# Patient Record
Sex: FEMALE | Race: WHITE | NOT HISPANIC OR LATINO
[De-identification: names, ages, dates, MRNs, and addresses within clinical notes are randomized per-mention and may not be internally consistent; named-entity substitution may affect disease eponyms.]

---

## 2019-08-08 ENCOUNTER — FORM ENCOUNTER (OUTPATIENT)
Age: 54
End: 2019-08-08

## 2020-01-30 ENCOUNTER — FORM ENCOUNTER (OUTPATIENT)
Age: 55
End: 2020-01-30

## 2020-06-05 ENCOUNTER — FORM ENCOUNTER (OUTPATIENT)
Age: 55
End: 2020-06-05

## 2020-08-13 ENCOUNTER — FORM ENCOUNTER (OUTPATIENT)
Age: 55
End: 2020-08-13

## 2021-02-26 ENCOUNTER — APPOINTMENT (OUTPATIENT)
Dept: BREAST CENTER | Facility: CLINIC | Age: 56
End: 2021-02-26

## 2021-03-01 PROBLEM — Z00.00 ENCOUNTER FOR PREVENTIVE HEALTH EXAMINATION: Status: ACTIVE | Noted: 2021-03-01

## 2021-03-15 DIAGNOSIS — Z78.9 OTHER SPECIFIED HEALTH STATUS: ICD-10-CM

## 2021-03-15 DIAGNOSIS — Z86.39 PERSONAL HISTORY OF OTHER ENDOCRINE, NUTRITIONAL AND METABOLIC DISEASE: ICD-10-CM

## 2021-03-15 DIAGNOSIS — Z85.3 PERSONAL HISTORY OF MALIGNANT NEOPLASM OF BREAST: ICD-10-CM

## 2021-03-18 ENCOUNTER — APPOINTMENT (OUTPATIENT)
Dept: BREAST CENTER | Facility: CLINIC | Age: 56
End: 2021-03-18
Payer: COMMERCIAL

## 2021-03-18 VITALS
BODY MASS INDEX: 32.78 KG/M2 | HEIGHT: 63 IN | DIASTOLIC BLOOD PRESSURE: 70 MMHG | SYSTOLIC BLOOD PRESSURE: 110 MMHG | WEIGHT: 185 LBS

## 2021-03-18 DIAGNOSIS — Z80.3 FAMILY HISTORY OF MALIGNANT NEOPLASM OF BREAST: ICD-10-CM

## 2021-03-18 DIAGNOSIS — Z86.39 PERSONAL HISTORY OF OTHER ENDOCRINE, NUTRITIONAL AND METABOLIC DISEASE: ICD-10-CM

## 2021-03-18 PROCEDURE — 99072 ADDL SUPL MATRL&STAF TM PHE: CPT

## 2021-03-18 PROCEDURE — 99213 OFFICE O/P EST LOW 20 MIN: CPT

## 2021-03-18 RX ORDER — ATORVASTATIN CALCIUM 20 MG/1
20 TABLET, FILM COATED ORAL
Refills: 0 | Status: ACTIVE | COMMUNITY

## 2021-03-18 RX ORDER — METFORMIN HYDROCHLORIDE 500 MG/1
500 TABLET, COATED ORAL
Refills: 0 | Status: ACTIVE | COMMUNITY

## 2021-03-18 RX ORDER — ERGOCALCIFEROL 1.25 MG/1
1.25 MG CAPSULE, LIQUID FILLED ORAL
Refills: 0 | Status: ACTIVE | COMMUNITY

## 2021-03-18 NOTE — PHYSICAL EXAM
[Normocephalic] : normocephalic [EOMI] : extra ocular movement intact [Supple] : supple [No Supraclavicular Adenopathy] : no supraclavicular adenopathy [No Cervical Adenopathy] : no cervical adenopathy [Examined in the supine and seated position] : examined in the supine and seated position [No dominant masses] : no dominant masses in right breast  [No dominant masses] : no dominant masses left breast [Breast Mass Right Breast ___cm] : no masses [Breast Nipple Inversion Left] : nipple not inverted [Breast Nipple Retraction Left] : nipple not retracted [Breast Nipple Flattening Left] : nipple not flattened [Breast Nipple Fissures Left] : nipple not fissured [Breast Abnormal Lactation (Galactorrhea) Left] : no galactorrhea [Breast Abnormal Secretion Bloody Fluid Left] : no bloody discharge [Breast Abnormal Secretion Serous Fluid Left] : no serous discharge [Breast Abnormal Secretion Opalescent Fluid Left] : no milky discharge [Breast Mass Left Breast ___cm] : no masses [No Axillary Lymphadenopathy] : no left axillary lymphadenopathy [No Edema] : no edema [No Rashes] : no rashes [No Ulceration] : no ulceration [de-identified] : On exam, the patient has the obvious mastectomy in the right breast with TRAM reconstruction.  I cannot feel any evidence of recurrence in the right TRAM reconstruction and no suspicious findings in the left breast.  She has no axillary, supraclavicular, or cervical adenopathy. [de-identified] : Status is post total mastectomy and TRAM reconstruction [de-identified] : Status post reduction mastopexy for symmetry

## 2021-03-18 NOTE — HISTORY OF PRESENT ILLNESS
[FreeTextEntry1] : The patient is a 56-year-old  postmenopausal white female.  She underwent menarche at age 12 and had her first child at age 28.  She underwent menopause at age 37 and never took any hormone replacement therapy.  She has no family history of breast or ovarian cancer.  The patient was diagnosed with a right breast cancer when she was 37 years old in  and underwent a right breast total mastectomy and TRAM reconstruction performed by Dr. Mallory Houston and Dr. Cash Damon at [facility].  She had a left breast reduction mastopexy for symmetry in .  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  She was advised on yearly MRIs in addition to yearly mammography and ultrasound and comes in for routine 6-month follow-up.

## 2021-03-18 NOTE — REASON FOR VISIT
[Follow-Up: _____] : a [unfilled] follow-up visit [FreeTextEntry1] : The patient comes in for routine follow-up with a history of undergoing a right breast mastectomy TRAM reconstruction by Dr. Kathryn Houston when she was still [facility] in 2002.  The reconstruction was performed by Dr. Cash Damon.  She had a reduction mastopexy in 2003 of the left breast.  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

## 2021-03-18 NOTE — ASSESSMENT
[FreeTextEntry1] : The patient is a 56-year-old  postmenopausal white female.  She underwent menarche at age 12 and had her first child at age 28.  She underwent menopause at age 37 and never took any hormone replacement therapy.  She has no family history of breast or ovarian cancer.  The patient was diagnosed with a right breast cancer when she was 37 years old in  and underwent a right breast total mastectomy and TRAM reconstruction performed by Dr. Mallory Houston and Dr. Cash Damon at [facility].  She had a left breast reduction mastopexy for symmetry in .  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  On exam, she has no evidence of recurrence over the right TRAM reconstruction in the left breast shows no suspicious findings.  She underwent a left breast mammography and ultrasound performed today here at [facility] on 2021 which is unchanged showing no suspicious findings.  Her last MRI was back in 2020 which was unchanged and negative.  She will be due for another bilateral breast MRI in 2021 and I would like to see her again in 6 months around 2021.

## 2022-02-14 DIAGNOSIS — Z12.31 ENCOUNTER FOR SCREENING MAMMOGRAM FOR MALIGNANT NEOPLASM OF BREAST: ICD-10-CM

## 2022-04-19 NOTE — PHYSICAL EXAM
[Normocephalic] : normocephalic [EOMI] : extra ocular movement intact [Supple] : supple [No Supraclavicular Adenopathy] : no supraclavicular adenopathy [No Cervical Adenopathy] : no cervical adenopathy [Examined in the supine and seated position] : examined in the supine and seated position [No dominant masses] : no dominant masses in right breast  [No dominant masses] : no dominant masses left breast [Breast Mass Right Breast ___cm] : no masses [Breast Nipple Inversion Left] : nipple not inverted [Breast Nipple Retraction Left] : nipple not retracted [Breast Nipple Flattening Left] : nipple not flattened [Breast Nipple Fissures Left] : nipple not fissured [Breast Abnormal Lactation (Galactorrhea) Left] : no galactorrhea [Breast Abnormal Secretion Bloody Fluid Left] : no bloody discharge [Breast Abnormal Secretion Serous Fluid Left] : no serous discharge [Breast Abnormal Secretion Opalescent Fluid Left] : no milky discharge [Breast Mass Left Breast ___cm] : no masses [No Axillary Lymphadenopathy] : no left axillary lymphadenopathy [No Edema] : no edema [No Rashes] : no rashes [No Ulceration] : no ulceration [de-identified] : On exam, the patient has the obvious mastectomy in the right breast with TRAM reconstruction.  I cannot feel any evidence of recurrence in the right TRAM reconstruction and no suspicious findings in the left breast.  She has no axillary, supraclavicular, or cervical adenopathy. [de-identified] : Status is post total mastectomy and TRAM reconstruction [de-identified] : Status post reduction mastopexy for symmetry

## 2022-04-19 NOTE — HISTORY OF PRESENT ILLNESS
[FreeTextEntry1] : The patient is a 56-year-old  postmenopausal white female.  She underwent menarche at age 12 and had her first child at age 28.  She underwent menopause at age 37 and never took any hormone replacement therapy.  She has no family history of breast or ovarian cancer.  The patient was diagnosed with a right breast cancer when she was 37 years old in  and underwent a right breast total mastectomy and TRAM reconstruction performed by Dr. Mallory Houston and Dr. Cash Damon at [facility].  She had a left breast reduction mastopexy for symmetry in .  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  She was advised on yearly MRIs in addition to yearly mammography and ultrasound and comes in for routine 6-month follow-up.

## 2022-04-19 NOTE — REASON FOR VISIT
[Follow-Up: _____] : a [unfilled] follow-up visit [FreeTextEntry1] : The patient comes in for routine follow-up with a history of undergoing a right breast mastectomy TRAM reconstruction by Dr. Kathryn Houston when she was still [facility] in 2002.  The reconstruction was performed by Dr. Cash Damon.  She had a reduction mastopexy in 2003 of the left breast.  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

## 2022-04-19 NOTE — ASSESSMENT
[FreeTextEntry1] : The patient is a 57-year-old  postmenopausal white female.  She underwent menarche at age 12 and had her first child at age 28.  She underwent menopause at age 37 and never took any hormone replacement therapy.  She has no family history of breast or ovarian cancer.  The patient was diagnosed with a right breast cancer when she was 37 years old in  and underwent a right breast total mastectomy and TRAM reconstruction performed by Dr. Mallory Houston and Dr. Cash Damon at [facility].  She had a left breast reduction mastopexy for symmetry in .  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  On exam, she has no evidence of recurrence over the right TRAM reconstruction in the left breast shows no suspicious findings.  She underwent her last left breast mammography and ultrasound performed which was reviewed from ??????? at [facility] on 2021 which is unchanged showing no suspicious findings.  Her last MRI was back in ???????? 2020 which was unchanged and negative.  The patient was reassured and should follow-up again in 6 months in 2022 due to this CHEK2 mutation.  Her next bilateral mammography and ultrasound will be due on ?????? and her next bilateral breast MRI will be due in ?????? and she was given prescriptions.

## 2022-04-22 ENCOUNTER — RESULT REVIEW (OUTPATIENT)
Age: 57
End: 2022-04-22

## 2022-04-22 ENCOUNTER — APPOINTMENT (OUTPATIENT)
Dept: BREAST CENTER | Facility: CLINIC | Age: 57
End: 2022-04-22
Payer: COMMERCIAL

## 2022-04-22 VITALS
WEIGHT: 170 LBS | OXYGEN SATURATION: 99 % | HEART RATE: 75 BPM | BODY MASS INDEX: 30.12 KG/M2 | SYSTOLIC BLOOD PRESSURE: 106 MMHG | HEIGHT: 63 IN | DIASTOLIC BLOOD PRESSURE: 68 MMHG

## 2022-04-22 PROCEDURE — 99213 OFFICE O/P EST LOW 20 MIN: CPT

## 2022-04-22 NOTE — PHYSICAL EXAM
[Normocephalic] : normocephalic [EOMI] : extra ocular movement intact [Supple] : supple [No Supraclavicular Adenopathy] : no supraclavicular adenopathy [No Cervical Adenopathy] : no cervical adenopathy [Examined in the supine and seated position] : examined in the supine and seated position [No dominant masses] : no dominant masses in right breast  [No dominant masses] : no dominant masses left breast [Breast Mass Right Breast ___cm] : no masses [Breast Mass Left Breast ___cm] : no masses [No Axillary Lymphadenopathy] : no left axillary lymphadenopathy [No Edema] : no edema [No Rashes] : no rashes [No Ulceration] : no ulceration [Breast Nipple Inversion Left] : nipple not inverted [Breast Nipple Retraction Left] : nipple not retracted [Breast Nipple Flattening Left] : nipple not flattened [Breast Nipple Fissures Left] : nipple not fissured [Breast Abnormal Secretion Bloody Fluid Left] : no bloody discharge [Breast Abnormal Lactation (Galactorrhea) Left] : no galactorrhea [Breast Abnormal Secretion Serous Fluid Left] : no serous discharge [Breast Abnormal Secretion Opalescent Fluid Left] : no milky discharge [de-identified] : On exam, the patient has the obvious mastectomy in the right breast with TRAM reconstruction.  I cannot feel any evidence of recurrence in the right TRAM reconstruction and no suspicious findings in the left breast.  She has no axillary, supraclavicular, or cervical adenopathy. [de-identified] : Status is post total mastectomy and TRAM reconstruction [de-identified] : Status post reduction mastopexy for symmetry

## 2022-04-22 NOTE — HISTORY OF PRESENT ILLNESS
[FreeTextEntry1] : The patient is a 56-year-old  postmenopausal white female.  She underwent menarche at age 12 and had her first child at age 28.  She underwent menopause at age 37 and never took any hormone replacement therapy.  She has no family history of breast or ovarian cancer.  The patient was diagnosed with a right breast cancer when she was 37 years old in  and underwent a right breast total mastectomy and TRAM reconstruction performed by Dr. Mallory Houston and Dr. Cash Damon at [facility].  She had a left breast reduction mastopexy for symmetry in .  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  She was advised on yearly MRIs in addition to yearly mammography and ultrasound and comes in for routine 6-month follow-up.

## 2022-04-22 NOTE — REASON FOR VISIT
[Follow-Up: _____] : a [unfilled] follow-up visit [FreeTextEntry1] : The patient comes in for routine follow-up with a history of undergoing a right breast mastectomy TRAM reconstruction by Dr. Kathryn Houston when she was still [facility] in 2002.  The reconstruction was performed by Dr. Cash Damon.  She had a reduction mastopexy in 2003 of the left breast.  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

## 2022-04-22 NOTE — ASSESSMENT
[FreeTextEntry1] : The patient is a 57-year-old  postmenopausal white female.  She underwent menarche at age 12 and had her first child at age 28.  She underwent menopause at age 37 and never took any hormone replacement therapy.  She has no family history of breast or ovarian cancer.  The patient was diagnosed with a right breast cancer when she was 37 years old in  and underwent a right breast total mastectomy and TRAM reconstruction performed by Dr. Mallory Houston and Dr. Cash Damon at [facility].  She had a left breast reduction mastopexy for symmetry in .  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  On exam, she has no evidence of recurrence over the right TRAM reconstruction in the left breast shows no suspicious findings.  She underwent her last left breast mammography and ultrasound performed which was reviewed from [facility] and performed on 2022 which is showing no suspicious findings but some chronic cystic findings in the left breast 1 2 and 3:00 regions and follow-up diagnostic left breast mammography and ultrasound was recommended in 6 months.  Her last MRI was back in 2020 which was unchanged and negative.  The patient was reassured and should follow-up again in 1 year in 2023 due to this CHEK2 mutation.  Her next diagnostic left breast mammography and ultrasound will be due in 2022 and would like her to get another bilateral breast MRI in 2022 due to this CHEK2 mutation and she was given prescriptions.  Unfortunately, the patient is moving down to [place], [place] and I have asked her to get all her studies done down there and she will only be able to come up to see me yearly so I will see her again in 2023.  She will need to get a CD-ROM of all her prior imaging from radiology before she goes down to [place] and she is aware of this.  She will try and find another breast physician down in [place] so she can get more routine 6-month clinical follow-up at that time.

## 2023-05-09 NOTE — HISTORY OF PRESENT ILLNESS
[FreeTextEntry1] : The patient is a 58-year-old  postmenopausal white female.  She underwent menarche at age 12 and had her first child at age 28.  She underwent menopause at age 37 and never took any hormone replacement therapy.  She has no family history of breast or ovarian cancer.  The patient was diagnosed with a right breast cancer when she was 37 years old in  and underwent a right breast total mastectomy and TRAM reconstruction performed by Dr. Mallory Houston and Dr. Cash Damon at [facility].  She had a left breast reduction mastopexy for symmetry in .  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  She was advised on yearly MRIs in addition to yearly mammography and ultrasound and she now lives in [place], [place] and comes in for yearly follow-up.

## 2023-05-09 NOTE — REASON FOR VISIT
[Follow-Up: _____] : a [unfilled] follow-up visit [FreeTextEntry1] : The patient comes in for routine follow-up with a history of undergoing a right breast mastectomy TRAM reconstruction by Dr. Kathryn Houston when she was still [facility] in 2002.  The reconstruction was performed by Dr. Cash Damon.  She had a reduction mastopexy in 2003 of the left breast.  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

## 2023-05-09 NOTE — ASSESSMENT
[FreeTextEntry1] : The patient is a 58-year-old  postmenopausal white female.  She underwent menarche at age 12 and had her first child at age 28.  She underwent menopause at age 37 and never took any hormone replacement therapy.  She has no family history of breast or ovarian cancer.  The patient was diagnosed with a right breast cancer when she was 37 years old in  and underwent a right breast total mastectomy and TRAM reconstruction performed by Dr. Mallory Houston and Dr. Cash Damon at [facility].  She had a left breast reduction mastopexy for symmetry in .  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  On exam, she has no evidence of recurrence over the right TRAM reconstruction in the left breast shows no suspicious findings.  She underwent a left breast mammography and ultrasound performed at [facility] on 2022 which showed no suspicious findings but some chronic cystic findings in the left breast 1 2 and 3:00 regions and follow-up diagnostic left breast mammography and ultrasound was recommended in 6 months and was performed on ?????? showing ???????. She has moved down to [place], [place] and underwent her last MRI which was performed on ????????.  The patient was reassured and should follow-up again in 1 year in May 2024 due to this CHEK2 mutation.  Her next diagnostic left breast mammography and ultrasound will be due in ??????? 2022 and I would like her to get another bilateral breast MRI in ??????? 2022 due to this CHEK2 mutation and she was given prescriptions.

## 2023-05-09 NOTE — PHYSICAL EXAM
[Normocephalic] : normocephalic [EOMI] : extra ocular movement intact [Supple] : supple [No Supraclavicular Adenopathy] : no supraclavicular adenopathy [No Cervical Adenopathy] : no cervical adenopathy [Examined in the supine and seated position] : examined in the supine and seated position [No dominant masses] : no dominant masses in right breast  [No dominant masses] : no dominant masses left breast [Breast Mass Right Breast ___cm] : no masses [Breast Nipple Inversion Left] : nipple not inverted [Breast Nipple Retraction Left] : nipple not retracted [Breast Nipple Flattening Left] : nipple not flattened [Breast Nipple Fissures Left] : nipple not fissured [Breast Abnormal Lactation (Galactorrhea) Left] : no galactorrhea [Breast Abnormal Secretion Bloody Fluid Left] : no bloody discharge [Breast Abnormal Secretion Serous Fluid Left] : no serous discharge [Breast Abnormal Secretion Opalescent Fluid Left] : no milky discharge [Breast Mass Left Breast ___cm] : no masses [No Axillary Lymphadenopathy] : no left axillary lymphadenopathy [No Edema] : no edema [No Rashes] : no rashes [No Ulceration] : no ulceration [de-identified] : On exam, the patient has the obvious mastectomy in the right breast with TRAM reconstruction.  I cannot feel any evidence of recurrence in the right TRAM reconstruction and no suspicious findings in the left breast.  She has no axillary, supraclavicular, or cervical adenopathy. [de-identified] : Status post reduction mastopexy for symmetry [de-identified] : Status is post total mastectomy and TRAM reconstruction

## 2023-05-15 ENCOUNTER — APPOINTMENT (OUTPATIENT)
Dept: BREAST CENTER | Facility: CLINIC | Age: 58
End: 2023-05-15

## 2023-07-22 ENCOUNTER — NON-APPOINTMENT (OUTPATIENT)
Age: 58
End: 2023-07-22

## 2023-07-22 NOTE — PHYSICAL EXAM
[Normocephalic] : normocephalic [EOMI] : extra ocular movement intact [Supple] : supple [No Supraclavicular Adenopathy] : no supraclavicular adenopathy [No Cervical Adenopathy] : no cervical adenopathy [Examined in the supine and seated position] : examined in the supine and seated position [No dominant masses] : no dominant masses in right breast  [No dominant masses] : no dominant masses left breast [Breast Mass Right Breast ___cm] : no masses [Breast Mass Left Breast ___cm] : no masses [No Axillary Lymphadenopathy] : no left axillary lymphadenopathy [No Edema] : no edema [No Rashes] : no rashes [No Ulceration] : no ulceration [Breast Nipple Inversion Left] : nipple not inverted [Breast Nipple Retraction Left] : nipple not retracted [Breast Nipple Flattening Left] : nipple not flattened [Breast Nipple Fissures Left] : nipple not fissured [Breast Abnormal Lactation (Galactorrhea) Left] : no galactorrhea [Breast Abnormal Secretion Bloody Fluid Left] : no bloody discharge [Breast Abnormal Secretion Serous Fluid Left] : no serous discharge [Breast Abnormal Secretion Opalescent Fluid Left] : no milky discharge [de-identified] : On exam, the patient has the obvious mastectomy in the right breast with TRAM reconstruction.  I cannot feel any evidence of recurrence in the right TRAM reconstruction and no suspicious findings in the left breast.  She has no axillary, supraclavicular, or cervical adenopathy. [de-identified] : Status is post total mastectomy and TRAM reconstruction [de-identified] : Status post reduction mastopexy for symmetry

## 2023-07-22 NOTE — REASON FOR VISIT
[Follow-Up: _____] : a [unfilled] follow-up visit [FreeTextEntry1] : The patient comes in for routine follow-up with a history of undergoing a right breast mastectomy TRAM reconstruction by Dr. Kathryn Houston when she was still [facility] in 2002.  The reconstruction was performed by Dr. Cash Damon.  She had a reduction mastopexy in 2003 of the left breast.  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

## 2023-07-22 NOTE — HISTORY OF PRESENT ILLNESS
[FreeTextEntry1] : The patient is a 58-year-old  postmenopausal white female.  She underwent menarche at age 12 and had her first child at age 28.  She underwent menopause at age 37 and never took any hormone replacement therapy.  She has no family history of breast or ovarian cancer.  The patient was diagnosed with a right breast cancer when she was 37 years old in  and underwent a right breast total mastectomy and TRAM reconstruction performed by Dr. Mallory Houston and Dr. Cash Damon at [facility].  She had a left breast reduction mastopexy for symmetry in .  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  She was advised on yearly MRIs in addition to yearly mammography and ultrasound and she now lives in [place], [place] and comes in for yearly follow-up.

## 2023-07-22 NOTE — ASSESSMENT
[FreeTextEntry1] : The patient is a 58-year-old  postmenopausal white female.  She underwent menarche at age 12 and had her first child at age 28.  She underwent menopause at age 37 and never took any hormone replacement therapy.  She has no family history of breast or ovarian cancer.  The patient was diagnosed with a right breast cancer when she was 37 years old in  and underwent a right breast total mastectomy and TRAM reconstruction performed by Dr. Mallory Houston and Dr. Cash Damon at [facility].  She had a left breast reduction mastopexy for symmetry in .  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  On exam, she has no evidence of recurrence over the right TRAM reconstruction and the left breast shows no suspicious findings.  She underwent her last left breast mammography performed in [place], [place] on 2023 which showed no suspicious findings but a benign-appearing left 5 mm intramammary lymph node. She has moved down to [place], [place] and underwent her last MRI which was performed on ????????.  The patient was reassured and should follow-up again in 1 year in 2024 due to this CHEK2 mutation.  Her next diagnostic left breast mammography and ultrasound will be due in 2024 and I would like her to get another bilateral breast MRI in ??????? 2023 due to this CHEK2 mutation and she was given prescriptions.

## 2023-08-01 ENCOUNTER — APPOINTMENT (OUTPATIENT)
Dept: BREAST CENTER | Facility: CLINIC | Age: 58
End: 2023-08-01
Payer: COMMERCIAL

## 2023-08-01 VITALS — BODY MASS INDEX: 30.12 KG/M2 | HEIGHT: 63 IN | WEIGHT: 170 LBS

## 2023-08-01 DIAGNOSIS — Z15.01 GENETIC SUSCEPTIBILITY TO MALIGNANT NEOPLASM OF BREAST: ICD-10-CM

## 2023-08-01 DIAGNOSIS — Z85.3 PERSONAL HISTORY OF MALIGNANT NEOPLASM OF BREAST: ICD-10-CM

## 2023-08-01 DIAGNOSIS — R92.2 INCONCLUSIVE MAMMOGRAM: ICD-10-CM

## 2023-08-01 DIAGNOSIS — Z90.11 ACQUIRED ABSENCE OF RIGHT BREAST AND NIPPLE: ICD-10-CM

## 2023-08-01 PROCEDURE — 99213 OFFICE O/P EST LOW 20 MIN: CPT

## 2023-08-01 NOTE — REASON FOR VISIT
[Follow-Up: _____] : a [unfilled] follow-up visit [FreeTextEntry1] : The patient comes in for routine follow-up with a history of undergoing a right breast mastectomy TRAM reconstruction by Dr. Kathryn Houston when she was still [facility] in 2002.  The reconstruction was performed by Dr. Cash Damon.  She had a reduction mastopexy in 2003 of the left breast.  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

## 2023-08-01 NOTE — ASSESSMENT
[FreeTextEntry1] : The patient is a 58-year-old  postmenopausal white female.  She underwent menarche at age 12 and had her first child at age 28.  She underwent menopause at age 37 and never took any hormone replacement therapy.  She has no family history of breast or ovarian cancer.  The patient was diagnosed with a right breast cancer when she was 37 years old in  and underwent a right breast total mastectomy and TRAM reconstruction performed by Dr. Mallory Houston and Dr. Cash Damon at [facility].  She had a left breast reduction mastopexy for symmetry in .  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  On exam, she has no evidence of recurrence over the right TRAM reconstruction and the left breast shows no suspicious findings.  She underwent her last left breast mammography performed in [place], [place] on 2023 which showed no suspicious findings but a benign-appearing left 5 mm intramammary lymph node. She has moved down to [place], [place] and underwent her last MRI which was performed in .  The patient was reassured and should follow-up again in 1 year in 2024 due to this CHEK2 mutation.  Her next diagnostic left breast mammography and ultrasound will be due in 2024 and I would like her to get another bilateral breast MRI in 2023 due to this CHEK2 mutation and she was given prescriptions.

## 2023-08-01 NOTE — PHYSICAL EXAM
[Normocephalic] : normocephalic [EOMI] : extra ocular movement intact [Supple] : supple [No Supraclavicular Adenopathy] : no supraclavicular adenopathy [No Cervical Adenopathy] : no cervical adenopathy [Examined in the supine and seated position] : examined in the supine and seated position [No dominant masses] : no dominant masses in right breast  [No dominant masses] : no dominant masses left breast [Breast Mass Right Breast ___cm] : no masses [Breast Mass Left Breast ___cm] : no masses [No Axillary Lymphadenopathy] : no left axillary lymphadenopathy [No Edema] : no edema [No Rashes] : no rashes [No Ulceration] : no ulceration [Breast Nipple Inversion Left] : nipple not inverted [Breast Nipple Retraction Left] : nipple not retracted [Breast Nipple Flattening Left] : nipple not flattened [Breast Nipple Fissures Left] : nipple not fissured [Breast Abnormal Lactation (Galactorrhea) Left] : no galactorrhea [Breast Abnormal Secretion Bloody Fluid Left] : no bloody discharge [Breast Abnormal Secretion Serous Fluid Left] : no serous discharge [Breast Abnormal Secretion Opalescent Fluid Left] : no milky discharge [de-identified] : On exam, the patient has the obvious mastectomy in the right breast with TRAM reconstruction.  I cannot feel any evidence of recurrence in the right TRAM reconstruction and no suspicious findings in the left breast.  She has no axillary, supraclavicular, or cervical adenopathy. [de-identified] : Status is post total mastectomy and TRAM reconstruction [de-identified] : Status post reduction mastopexy for symmetry

## 2023-08-01 NOTE — HISTORY OF PRESENT ILLNESS
[FreeTextEntry1] : The patient is a 58-year-old  postmenopausal white female.  She underwent menarche at age 12 and had her first child at age 28.  She underwent menopause at age 37 and never took any hormone replacement therapy.  She has no family history of breast or ovarian cancer.  The patient was diagnosed with a right breast cancer when she was 37 years old in  and underwent a right breast total mastectomy and TRAM reconstruction performed by Dr. Mallory Houston and Dr. Cash Damon at [facility].  She had a left breast reduction mastopexy for symmetry in .  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  She was advised on yearly MRIs in addition to yearly mammography and ultrasound and she now lives in [place], [place] and comes in for yearly follow-up.

## 2024-03-26 DIAGNOSIS — Z91.89 OTHER SPECIFIED PERSONAL RISK FACTORS, NOT ELSEWHERE CLASSIFIED: ICD-10-CM

## 2024-07-09 ENCOUNTER — NON-APPOINTMENT (OUTPATIENT)
Age: 59
End: 2024-07-09

## 2024-08-02 NOTE — HISTORY OF PRESENT ILLNESS
[FreeTextEntry1] : The patient is a 59-year-old  postmenopausal white female.  She underwent menarche at age 12 and had her first child at age 28.  She underwent menopause at age 37 and never took any hormone replacement therapy.  She has no family history of breast or ovarian cancer.  The patient was diagnosed with a right breast cancer when she was 37 years old in  and underwent a right breast total mastectomy and TRAM reconstruction performed by Dr. Mallory Houston and Dr. Cash Damon at [facility].  She had a left breast reduction mastopexy for symmetry in .  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  She was advised on yearly MRIs in addition to yearly mammography and ultrasound and she now lives in [place], [place] and comes in for yearly follow-up.

## 2024-08-02 NOTE — REASON FOR VISIT
[Follow-Up: _____] : a [unfilled] follow-up visit [FreeTextEntry1] : The patient comes in for routine follow-up with a history of undergoing a right breast mastectomy TRAM reconstruction by Dr. Kathryn Houston when she was still [facility] in 2002.  The reconstruction was performed by Dr. Cash Damon.  She had a reduction mastopexy in 2003 of the left breast.  She tested CHEK2 positive in 2017 and comes in now for routine exams and gets yearly mammography, ultrasound, and MRIs.

## 2024-08-02 NOTE — ASSESSMENT
[FreeTextEntry1] : The patient is a 59-year-old  postmenopausal white female.  She underwent menarche at age 12 and had her first child at age 28.  She underwent menopause at age 37 and never took any hormone replacement therapy.  She has no family history of breast or ovarian cancer.  The patient was diagnosed with a right breast cancer when she was 37 years old in  and underwent a right breast total mastectomy and TRAM reconstruction performed by Dr. Mallory Houston and Dr. Cash Damon at [facility].  She had a left breast reduction mastopexy for symmetry in .  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  On exam, she has no evidence of recurrence over the right TRAM reconstruction and the left breast shows no suspicious findings.  She underwent her last left breast mammography performed in [place], [place] on ?????? 2023 which showed no suspicious findings but a benign-appearing left 5 mm intramammary lymph node. She has moved down to [place], [place] and underwent her last MRI which was performed in ?????? .  The patient was reassured and should follow-up again in 1 year in 2025 due to this CHEK2 mutation.  Her next diagnostic left breast mammography and ultrasound will be due in ?????? 2025 and I would like her to get another bilateral breast MRI in ??????? 2024 due to this CHEK2 mutation and she was given prescriptions.

## 2024-08-02 NOTE — PHYSICAL EXAM
[Normocephalic] : normocephalic [EOMI] : extra ocular movement intact [Supple] : supple [No Supraclavicular Adenopathy] : no supraclavicular adenopathy [No Cervical Adenopathy] : no cervical adenopathy [Examined in the supine and seated position] : examined in the supine and seated position [No dominant masses] : no dominant masses in right breast  [No dominant masses] : no dominant masses left breast [Breast Mass Right Breast ___cm] : no masses [Breast Nipple Inversion Left] : nipple not inverted [Breast Nipple Retraction Left] : nipple not retracted [Breast Nipple Flattening Left] : nipple not flattened [Breast Nipple Fissures Left] : nipple not fissured [Breast Abnormal Lactation (Galactorrhea) Left] : no galactorrhea [Breast Abnormal Secretion Serous Fluid Left] : no serous discharge [Breast Abnormal Secretion Bloody Fluid Left] : no bloody discharge [Breast Abnormal Secretion Opalescent Fluid Left] : no milky discharge [Breast Mass Left Breast ___cm] : no masses [No Axillary Lymphadenopathy] : no left axillary lymphadenopathy [No Edema] : no edema [No Rashes] : no rashes [No Ulceration] : no ulceration [de-identified] : On exam, the patient has the obvious mastectomy in the right breast with TRAM reconstruction.  I cannot feel any evidence of recurrence in the right TRAM reconstruction and no suspicious findings in the left breast.  She has no axillary, supraclavicular, or cervical adenopathy. [de-identified] : Status is post total mastectomy and TRAM reconstruction [de-identified] : Status post reduction mastopexy for symmetry

## 2024-08-09 ENCOUNTER — APPOINTMENT (OUTPATIENT)
Dept: BREAST CENTER | Facility: CLINIC | Age: 59
End: 2024-08-09

## 2025-07-29 ENCOUNTER — APPOINTMENT (OUTPATIENT)
Dept: BREAST CENTER | Facility: CLINIC | Age: 60
End: 2025-07-29
Payer: COMMERCIAL

## 2025-07-29 ENCOUNTER — NON-APPOINTMENT (OUTPATIENT)
Age: 60
End: 2025-07-29

## 2025-07-29 VITALS — WEIGHT: 177 LBS | BODY MASS INDEX: 31.36 KG/M2 | HEIGHT: 63 IN

## 2025-07-29 DIAGNOSIS — R92.30 DENSE BREASTS, UNSPECIFIED: ICD-10-CM

## 2025-07-29 DIAGNOSIS — Z12.31 ENCOUNTER FOR SCREENING MAMMOGRAM FOR MALIGNANT NEOPLASM OF BREAST: ICD-10-CM

## 2025-07-29 DIAGNOSIS — Z90.11 ACQUIRED ABSENCE OF RIGHT BREAST AND NIPPLE: ICD-10-CM

## 2025-07-29 DIAGNOSIS — Z15.01 GENETIC SUSCEPTIBILITY TO MALIGNANT NEOPLASM OF BREAST: ICD-10-CM

## 2025-07-29 DIAGNOSIS — Z85.3 PERSONAL HISTORY OF MALIGNANT NEOPLASM OF BREAST: ICD-10-CM

## 2025-07-29 PROCEDURE — 99213 OFFICE O/P EST LOW 20 MIN: CPT

## 2025-08-15 ENCOUNTER — NON-APPOINTMENT (OUTPATIENT)
Age: 60
End: 2025-08-15